# Patient Record
Sex: FEMALE | Race: OTHER
[De-identification: names, ages, dates, MRNs, and addresses within clinical notes are randomized per-mention and may not be internally consistent; named-entity substitution may affect disease eponyms.]

---

## 2021-11-10 ENCOUNTER — TRANSCRIPTION ENCOUNTER (OUTPATIENT)
Age: 71
End: 2021-11-10

## 2022-03-01 VITALS
WEIGHT: 114.64 LBS | HEIGHT: 59 IN | SYSTOLIC BLOOD PRESSURE: 120 MMHG | BODY MASS INDEX: 23.11 KG/M2 | DIASTOLIC BLOOD PRESSURE: 70 MMHG | TEMPERATURE: 97.1 F

## 2022-06-12 ENCOUNTER — NON-APPOINTMENT (OUTPATIENT)
Age: 72
End: 2022-06-12

## 2022-08-23 PROBLEM — Z00.00 ENCOUNTER FOR PREVENTIVE HEALTH EXAMINATION: Status: ACTIVE | Noted: 2022-08-23

## 2022-08-25 ENCOUNTER — NON-APPOINTMENT (OUTPATIENT)
Age: 72
End: 2022-08-25

## 2022-08-30 ENCOUNTER — APPOINTMENT (OUTPATIENT)
Dept: CARDIOLOGY | Facility: CLINIC | Age: 72
End: 2022-08-30

## 2022-10-31 ENCOUNTER — EMERGENCY (EMERGENCY)
Facility: HOSPITAL | Age: 72
LOS: 0 days | Discharge: HOME | End: 2022-10-31
Attending: EMERGENCY MEDICINE | Admitting: EMERGENCY MEDICINE

## 2022-10-31 VITALS
OXYGEN SATURATION: 98 % | HEART RATE: 86 BPM | DIASTOLIC BLOOD PRESSURE: 57 MMHG | WEIGHT: 130.07 LBS | SYSTOLIC BLOOD PRESSURE: 116 MMHG | RESPIRATION RATE: 18 BRPM

## 2022-10-31 DIAGNOSIS — E03.9 HYPOTHYROIDISM, UNSPECIFIED: ICD-10-CM

## 2022-10-31 DIAGNOSIS — R06.02 SHORTNESS OF BREATH: ICD-10-CM

## 2022-10-31 DIAGNOSIS — I65.23 OCCLUSION AND STENOSIS OF BILATERAL CAROTID ARTERIES: ICD-10-CM

## 2022-10-31 DIAGNOSIS — F32.A DEPRESSION, UNSPECIFIED: ICD-10-CM

## 2022-10-31 DIAGNOSIS — Z20.822 CONTACT WITH AND (SUSPECTED) EXPOSURE TO COVID-19: ICD-10-CM

## 2022-10-31 DIAGNOSIS — E78.5 HYPERLIPIDEMIA, UNSPECIFIED: ICD-10-CM

## 2022-10-31 DIAGNOSIS — F33.1 MAJOR DEPRESSIVE DISORDER, RECURRENT, MODERATE: ICD-10-CM

## 2022-10-31 DIAGNOSIS — E78.2 MIXED HYPERLIPIDEMIA: ICD-10-CM

## 2022-10-31 DIAGNOSIS — R94.31 ABNORMAL ELECTROCARDIOGRAM [ECG] [EKG]: ICD-10-CM

## 2022-10-31 LAB
ANION GAP SERPL CALC-SCNC: 11 MMOL/L — SIGNIFICANT CHANGE UP (ref 7–14)
APAP SERPL-MCNC: <5 UG/ML — LOW (ref 10–30)
BASOPHILS # BLD AUTO: 0.08 K/UL — SIGNIFICANT CHANGE UP (ref 0–0.2)
BASOPHILS NFR BLD AUTO: 1 % — SIGNIFICANT CHANGE UP (ref 0–1)
BUN SERPL-MCNC: 11 MG/DL — SIGNIFICANT CHANGE UP (ref 10–20)
CALCIUM SERPL-MCNC: 9.4 MG/DL — SIGNIFICANT CHANGE UP (ref 8.4–10.5)
CHLORIDE SERPL-SCNC: 98 MMOL/L — SIGNIFICANT CHANGE UP (ref 98–110)
CO2 SERPL-SCNC: 26 MMOL/L — SIGNIFICANT CHANGE UP (ref 17–32)
CREAT SERPL-MCNC: 0.6 MG/DL — LOW (ref 0.7–1.5)
EGFR: 95 ML/MIN/1.73M2 — SIGNIFICANT CHANGE UP
EOSINOPHIL # BLD AUTO: 0.17 K/UL — SIGNIFICANT CHANGE UP (ref 0–0.7)
EOSINOPHIL NFR BLD AUTO: 2.1 % — SIGNIFICANT CHANGE UP (ref 0–8)
ETHANOL SERPL-MCNC: <10 MG/DL — SIGNIFICANT CHANGE UP
GLUCOSE SERPL-MCNC: 104 MG/DL — HIGH (ref 70–99)
HCT VFR BLD CALC: 36.4 % — LOW (ref 37–47)
HGB BLD-MCNC: 12.4 G/DL — SIGNIFICANT CHANGE UP (ref 12–16)
IMM GRANULOCYTES NFR BLD AUTO: 0.2 % — SIGNIFICANT CHANGE UP (ref 0.1–0.3)
LYMPHOCYTES # BLD AUTO: 2.69 K/UL — SIGNIFICANT CHANGE UP (ref 1.2–3.4)
LYMPHOCYTES # BLD AUTO: 32.6 % — SIGNIFICANT CHANGE UP (ref 20.5–51.1)
MCHC RBC-ENTMCNC: 28.7 PG — SIGNIFICANT CHANGE UP (ref 27–31)
MCHC RBC-ENTMCNC: 34.1 G/DL — SIGNIFICANT CHANGE UP (ref 32–37)
MCV RBC AUTO: 84.3 FL — SIGNIFICANT CHANGE UP (ref 81–99)
MONOCYTES # BLD AUTO: 0.87 K/UL — HIGH (ref 0.1–0.6)
MONOCYTES NFR BLD AUTO: 10.5 % — HIGH (ref 1.7–9.3)
NEUTROPHILS # BLD AUTO: 4.43 K/UL — SIGNIFICANT CHANGE UP (ref 1.4–6.5)
NEUTROPHILS NFR BLD AUTO: 53.6 % — SIGNIFICANT CHANGE UP (ref 42.2–75.2)
NRBC # BLD: 0 /100 WBCS — SIGNIFICANT CHANGE UP (ref 0–0)
PLATELET # BLD AUTO: 251 K/UL — SIGNIFICANT CHANGE UP (ref 130–400)
POTASSIUM SERPL-MCNC: 4.3 MMOL/L — SIGNIFICANT CHANGE UP (ref 3.5–5)
POTASSIUM SERPL-SCNC: 4.3 MMOL/L — SIGNIFICANT CHANGE UP (ref 3.5–5)
RBC # BLD: 4.32 M/UL — SIGNIFICANT CHANGE UP (ref 4.2–5.4)
RBC # FLD: 12.4 % — SIGNIFICANT CHANGE UP (ref 11.5–14.5)
SALICYLATES SERPL-MCNC: <0.3 MG/DL — LOW (ref 4–30)
SARS-COV-2 RNA SPEC QL NAA+PROBE: SIGNIFICANT CHANGE UP
SODIUM SERPL-SCNC: 135 MMOL/L — SIGNIFICANT CHANGE UP (ref 135–146)
WBC # BLD: 8.26 K/UL — SIGNIFICANT CHANGE UP (ref 4.8–10.8)
WBC # FLD AUTO: 8.26 K/UL — SIGNIFICANT CHANGE UP (ref 4.8–10.8)

## 2022-10-31 PROCEDURE — 99285 EMERGENCY DEPT VISIT HI MDM: CPT

## 2022-10-31 PROCEDURE — 93010 ELECTROCARDIOGRAM REPORT: CPT

## 2022-10-31 PROCEDURE — 90792 PSYCH DIAG EVAL W/MED SRVCS: CPT | Mod: 95

## 2022-10-31 RX ORDER — SODIUM CHLORIDE 9 MG/ML
1000 INJECTION, SOLUTION INTRAVENOUS ONCE
Refills: 0 | Status: COMPLETED | OUTPATIENT
Start: 2022-10-31 | End: 2022-10-31

## 2022-10-31 RX ADMIN — SODIUM CHLORIDE 1000 MILLILITER(S): 9 INJECTION, SOLUTION INTRAVENOUS at 19:06

## 2022-10-31 RX ADMIN — SODIUM CHLORIDE 1000 MILLILITER(S): 9 INJECTION, SOLUTION INTRAVENOUS at 20:24

## 2022-10-31 NOTE — ED BEHAVIORAL HEALTH ASSESSMENT NOTE - OTHER PAST PSYCHIATRIC HISTORY (INCLUDE DETAILS REGARDING ONSET, COURSE OF ILLNESS, INPATIENT/OUTPATIENT TREATMENT)
reports remote treatment, no hospitalizations  recently prescribed Paxil by psychiatrist in [place] but has not been fully compliant

## 2022-10-31 NOTE — ED PROVIDER NOTE - NSFOLLOWUPCLINICS_GEN_ALL_ED_FT
[facility]  OP Mental Health  [address] [place] [id number]  Phone: [phone]  Fax:

## 2022-10-31 NOTE — ED BEHAVIORAL HEALTH ASSESSMENT NOTE - RISK ASSESSMENT
Low Acute Suicide Risk Risk factors include prior hx of depression, current depressive sx, old age.  Protective: supportive family, engagement in treatment, no prior self-harm

## 2022-10-31 NOTE — ED PROVIDER NOTE - CLINICAL SUMMARY MEDICAL DECISION MAKING FREE TEXT BOX
72yF presents with depression  triggered by sick family member no ANTOINE  pt medically cleared -  evaluated by telepsych - cleared for dc and outpt  mental health follow up

## 2022-10-31 NOTE — ED BEHAVIORAL HEALTH ASSESSMENT NOTE - HPI (INCLUDE ILLNESS QUALITY, SEVERITY, DURATION, TIMING, CONTEXT, MODIFYING FACTORS, ASSOCIATED SIGNS AND SYMPTOMS)
The patient is a 71 yo woman, domiciled with her /son/daughter-in-law, with PMH hypothyroidism and HLD, PPH long hx of MDD, but no prior hospitalizations, no SA, recently prescribed antidepressant while in [place] (Paxil 12.5mg), who is brought in by daughter-in-law due to concern for depression since returning from recent trip to [place].    On interview, the patient shows somewhat flat affect, congruent with her history. She is a detailed, reliable historian. She reports that in the past two weeks, she has been feeling low mood which would last several days and then improve spontaneously, along with anhedonia and lack of motivation, increased sleep, low energy, decreased interest in eating due to sleeping during the day. She reports self-discontinuing her Paxil (initially prescribed June/July) recently due to preference for natural remedies, and since then experiencing return of depressive sx. Patient expresses sometimes feeling like a burden and has thoughts of feeling "useless" but no suicidal thoughts beyond this. She reports some exhaustion form making "sacrifices" for her family. She continues to be motivated for outpatient treatment and is very open to receiving referrals.    See SW note for collateral from daughter-in-law. In short, she corroborates the history above from patient, and denies any safety concerns and has no concerns about taking patient home at this time. She also would prefer that patient not be hospitalized if possible.

## 2022-10-31 NOTE — ED BEHAVIORAL HEALTH ASSESSMENT NOTE - NSSUICPROTFACT_PSY_ALL_CORE
Responsibility to children, family, or others/Supportive social network of family or friends/Cultural, spiritual and/or moral attitudes against suicide/Frustration tolerance

## 2022-10-31 NOTE — ED PROVIDER NOTE - PHYSICAL EXAMINATION
Physical Exam    Vital Signs: I have reviewed the initial vital signs.  Constitutional: appears stated age, no acute distress  Eyes: Conjunctiva pink, Sclera clear  Cardiovascular: S1 and S2, regular rate, regular rhythm, well-perfused extremities, radial pulses equal and 2+, pedal pulses 2+ and equal  Respiratory: unlabored respiratory effort, clear to auscultation bilaterally no wheezing, rales and rhonchi  Gastrointestinal: soft, non-tender abdomen, no pulsatile mass, normal bowl sounds  Musculoskeletal: supple neck, no lower extremity edema, no midline tenderness  Integumentary: warm, dry, no rash  Neurologic: awake, alert, nvi  Psychiatric: appropriate mood, appropriate affect

## 2022-10-31 NOTE — ED ADULT NURSE NOTE - OBJECTIVE STATEMENT
As per pt family, patient has not been eating or drinking, and does not have energy to perform ADLs, she is depressed.

## 2022-10-31 NOTE — ED PROVIDER NOTE - NS ED ATTENDING STATEMENT MOD
This was a shared visit with the JAK. I reviewed and verified the documentation and independently performed the documented:

## 2022-10-31 NOTE — ED ADULT TRIAGE NOTE - CHIEF COMPLAINT QUOTE
As per patient's daughter in law, patient has been depressed lately. Not sleeping properly, not eating and lack of motivation

## 2022-10-31 NOTE — ED PROVIDER NOTE - PATIENT PORTAL LINK FT
You can access the FollowMyHealth Patient Portal offered by [facility] by registering at the following website: [email or web address]. By joining [employer]’s FollowMyHealth portal, you will also be able to view your health information using other applications (apps) compatible with our system.

## 2022-10-31 NOTE — ED BEHAVIORAL HEALTH NOTE - BEHAVIORAL HEALTH NOTE
================  FOR EACH COLLATERAL   ================  NAME: Regina Crowley     NUMBER: [phone]  RELATIONSHIP: Adult daughter-in-law  RELIABILITY: High    Patient gives permission to obtain collateral from daughter-in-law:  ( x ) Yes  (  )  No  Rationale for overriding objection            (  ) Lack of capacity. Details: ________            (  ) Assessing risk of danger to self/others. Details: ________  Rationale for selecting specific collateral source            (  ) Potential to impact risk of danger to self/others and no alternative equivalent. Details:  Collateral has requested that the information provided remain confidential: Yes [  ] No [ x ]  Collateral has provided information that patient is/may be unaware of: Yes [  ] No [ x ]    Patient is a 72F domiciled with adult son, daughter-in-law, and 15yo grandson, with 2 adult children, PPHx/o depression with hx/o outpatient psychiatric treatment, no IPP admissions, currently Rx = Paxil 12.5MG, Synthroid 50MG QD, and Rovastatin 10MG QD prescribed by pt's PCP, no legal hx, no access to firearms.      Regina stated that she brought the pt to the ER for concern of worsening depressive sx that have persisted in the last 12 days. Regina stated that the patient returned from a trip from [place] around the time that the patient started exhibiting sx/o worsening depression. In addition, Regina has observed increased sleep, getting out of bed, and poor appetite. Regina states patient has had extensive hx/o depression managed on an outpatient level in the past, however now currently is prescribed her psych meds from her PCP. Regina denies patient has expressed SI/HI/AVH, denies past SA/self-injury, denies hx/o aggression/violence. Regina denies safety concerns of patient returning home in the event of psychiatric clearance.

## 2022-10-31 NOTE — ED BEHAVIORAL HEALTH ASSESSMENT NOTE - NSBHATTESTTYPEVISIT_PSY_A_CORE
[Nausea] : nausea [Abdominal Pain] : abdominal pain [Hematochezia] : hematochezia [Negative] : Allergic/Immunologic [Emesis] : no emesis [Hematemesis] : no hematemesis [Constipation] : no constipation [Diarrhea] : no diarrhea Attending Only

## 2022-10-31 NOTE — ED BEHAVIORAL HEALTH ASSESSMENT NOTE - SUMMARY
This is a 73 yo woman with hx of MDD with recent reemergence of depressive sx, predominantly vegetative sx, in the past two weeks in setting of recent medication discontinuation. Patient currently meets criteria for a major depressive episode but maintains reasonable functioning as per mental status exam today and collateral information, and is not gravely disabled as to require involuntary psychiatric admission. She does not have suicidal ideation. Family's preference is also for her to be treated in the outpatient setting. Patient is open to outpatient referral, and this avenue including further med titration can be tried first before considering higher level of care if pt does not improve sufficiently in the near future.    Plan:  - psychiatrically cleared for discharge  - referral placed for [facility] outpatient MH clinic. They will reach out to pt to schedule appointment  - in the mean time, continue home Paxil ER 12.5mg

## 2022-10-31 NOTE — ED PROVIDER NOTE - ATTENDING APP SHARED VISIT CONTRIBUTION OF CARE
intepreter # [id number]    72-year-old female with a past medical history significant for depression, hypothyroidism, hyperlipidemia who presents with depression.  Patient states that since she returned from [place] and learning that a family member was sick she has been having depression.  Patient denies any SI HI.  Patient also denies any medical complaints.  Patient's daughter is at bedside who states that patient is not more confused or acting abnormally.  However patient is refusing to eat.     VITAL SIGNS: I have reviewed nursing notes and confirm.  CONSTITUTIONAL: non-toxic, well appearing  SKIN: no rash, no petechiae.  EYES: EOMI, pink conjunctiva, anicteric  ENT: tongue midline, no exudates, MMM  NECK: Supple; no meningismus, no JVD  CARD: RRR, no murmurs, equal radial pulses bilaterally 2+  RESP: CTAB, no respiratory distress  ABD: Soft, non-tender, non-distended, no peritoneal signs, no HSM, no CVA tenderness  EXT: Normal ROM x4. No edema. No calves tenderness  NEURO: Alert, oriented x3. CN2-12 intact, equal strength bilaterally, nl gait.  PSYCH: Cooperative, appropriate.    a/p  72 yr old f that presents with depression   -labs  -psych eval  -reassess  -dispo pending

## 2022-10-31 NOTE — ED PROVIDER NOTE - NS ED ROS FT
Constitutional: (-) fever, (-) chills  Eyes: (-) visual changes  ENT: (-) nasal congestions  Cardiovascular: (-) chest pain, (-) syncope  Respiratory: (-) cough, (-) shortness of breath, (-) dyspnea,   Gastrointestinal: (-) vomiting, (-) diarrhea, (-)nausea,  Musculoskeletal: (-) neck pain, (-) back pain, (-) joint pain,  Integumentary: (-) rash, (-) edema, (-) bruises  Neurological: (-) headache, (-) loc, (-) dizziness, (-) tingling, (-)numbness,  Psychiatric: (-) hallucinations, (-)nervousness, (+)depression, (-)SI/HI  Peripheral Vascular: (-) leg swelling  :  (-)dysuria,  (-) hematuria  Allergic/Immunologic: (-) pruritus

## 2022-10-31 NOTE — ED BEHAVIORAL HEALTH ASSESSMENT NOTE - NSBHADMITCOORDWITH_PSY_A_CORE
I spent a total of 50 minutes reviewing past medical records, evaluating the patient, discussing treatment options with the patient, communicating with other healthcare professionals, coordinating care, and documenting in the EMR./medical staff/family/Caregiver

## 2022-10-31 NOTE — ED BEHAVIORAL HEALTH NOTE - BEHAVIORAL HEALTH NOTE
==================       PRE-HOSPITAL COURSE       ===================      SOURCE:  Secondhand EMR documentation.       DETAILS:  Patient presents by daughter in law to ED; chief complaint of depression.     ==============   ED COURSE:       ===========       SOURCE:  RN and secondhand EMR documentation.        ARRIVAL:  Patient noted to be cooperative with hospital protocol. Patient presents with good hygiene/grooming.         BELONGINGS:  None notable.       BEHAVIOR: Blood provided for routine labs without noted incident. NO SI/HI/AH/VH elicited per RN. Patient is alert, oriented, and makes eye contact; speech of normal volume and rate accompanied by logical thought process. Patient has remained calm and cooperating with ED staff, sitting in chair in front of nurses station.   TREATMENT: Patient had not required medication intervention while in ED; remains in behavioral control.   Visitors: Patient presently unaccompanied by social supports while in ED; daughter in law was reportedly in ED earlier.

## 2022-10-31 NOTE — ED PROVIDER NOTE - NSFOLLOWUPINSTRUCTIONS_ED_ALL_ED_FT
Follow up with outpatient mental health    RETURN TO ED  FOR ANY NEW WORSENING OR CONCERNING SYMPTOMS TO YOU, ALSO AS WE DISCUSSED. WE ARE HERE AND HAPPY TO TAKE CARE OF YOU        DEPRESSION - General Information     Depression    WHAT YOU NEED TO KNOW:    What is depression? Depression is a medical condition that causes feelings of sadness or hopelessness that do not go away. Depression may cause you to lose interest in things you used to enjoy. These feelings may interfere with your daily life.    What causes or increases my risk for depression? Depression may be caused by changes in brain chemicals that affect your mood. Your risk for depression may be higher if you have any of the following:    Stressful events such as the death of a loved one, unemployment, or divorce      A family history of depression      A chronic medical condition, such as diabetes, heart disease, or cancer      Drug or alcohol abuse    What are the signs and symptoms of depression?     Appetite changes, or weight gain or loss      Trouble going to sleep or staying asleep, or sleeping too much      Fatigue or lack of energy      Feeling restless, irritable, or withdrawn      Feeling worthless, hopeless, discouraged, or guilty      Trouble concentrating, remembering things, doing daily tasks, or making decisions      Thoughts about hurting or killing yourself    How is depression diagnosed? Your healthcare provider will ask about your symptoms and how long you have had them. He or she will ask if you have any family members with depression. Tell your healthcare provider about any stressful events in your life. He or she may ask about any other health conditions or medicines you take.     How is depression treated?     Therapy is often used together with medicine. Therapy is a way for you to talk about your feelings and anything that may be causing depression. Therapy can be done alone or in a group. It may also be done with family members or a significant other.      Antidepressant medicine may be given to relieve depression. You may need to take this medicine for several weeks before you begin to feel better. Tell your healthcare provider about any side effects or problems you have with your medicine. The type or amount of medicine may need to be changed.    How can I manage depression?     Get regular physical activity. Try to be active for 30 minutes, 3 to 5 days a week. Physical activity can help relieve depression. Work with your healthcare provider to develop a plan that you enjoy. It may help to ask someone to be active with you.      Create a regular sleep schedule. A routine can help you relax before bed. Listen to music, read, or do yoga. Try to go to bed and wake up at the same time every day. Sleep is important for emotional health.      Eat a variety of healthy foods. Healthy foods include fruits, vegetables, whole-grain breads, low-fat dairy products, lean meats, fish, and cooked beans. A healthy meal plan is low in fat, salt, and added sugar.      Do not drink alcohol or use drugs. Alcohol and drugs can make depression worse. Talk to your therapist or doctor if you need help quitting.    The following resources are available at any time to help you, if needed:     National Suicide Prevention Lifeline: 1-381.328.7534 (8-366-162-TALK)      Suicide Hotline: 1-935.607.2234 (5-154-DPJBCJT)      For a list of international numbers: https://save.org/find-help/international-resources/    Call your local emergency number (911 in the [place]) if:     You think about harming yourself or someone else.      You have done something on purpose to hurt yourself.    When should I call my therapist or doctor?     Your symptoms do not improve.      You cannot make it to your next appointment.       You have new symptoms.    You have questions or concerns about your condition or care    CARE AGREEMENT:  You have the right to help plan your care. Learn about your health condition and how it may be treated. Discuss treatment options with your healthcare providers to decide what care you want to receive. You always have the right to refuse treatmen

## 2022-11-01 DIAGNOSIS — R06.02 SHORTNESS OF BREATH: ICD-10-CM

## 2022-11-01 DIAGNOSIS — E03.9 HYPOTHYROIDISM, UNSPECIFIED: ICD-10-CM

## 2022-11-01 DIAGNOSIS — E78.2 MIXED HYPERLIPIDEMIA: ICD-10-CM

## 2022-11-01 DIAGNOSIS — I10 ESSENTIAL (PRIMARY) HYPERTENSION: ICD-10-CM

## 2022-11-01 DIAGNOSIS — I65.23 OCCLUSION AND STENOSIS OF BILATERAL CAROTID ARTERIES: ICD-10-CM

## 2022-11-09 ENCOUNTER — APPOINTMENT (OUTPATIENT)
Dept: CARDIOLOGY | Facility: CLINIC | Age: 72
End: 2022-11-09

## 2022-11-21 ENCOUNTER — NON-APPOINTMENT (OUTPATIENT)
Age: 72
End: 2022-11-21

## 2022-11-21 DIAGNOSIS — R94.31 ABNORMAL ELECTROCARDIOGRAM [ECG] [EKG]: ICD-10-CM

## 2022-11-21 RX ORDER — LEVOTHYROXINE SODIUM 50 UG/1
50 TABLET ORAL DAILY
Refills: 0 | Status: ACTIVE | COMMUNITY

## 2022-11-21 RX ORDER — BUPROPION HYDROCHLORIDE 150 MG/1
150 TABLET, FILM COATED, EXTENDED RELEASE ORAL
Refills: 0 | Status: ACTIVE | COMMUNITY

## 2022-11-21 RX ORDER — ROSUVASTATIN CALCIUM 10 MG/1
10 TABLET, FILM COATED ORAL DAILY
Refills: 0 | Status: ACTIVE | COMMUNITY

## 2022-12-06 ENCOUNTER — APPOINTMENT (OUTPATIENT)
Dept: CARDIOLOGY | Facility: CLINIC | Age: 72
End: 2022-12-06

## 2023-06-24 NOTE — ED PROVIDER NOTE - OBJECTIVE STATEMENT
71 yo female, no pmh, presents to ed with daughter for depression, just got back from [place], pt feels sad since brother is ill, dec po intake, no pain or radiation. Denies fever, chills, cp, sob, neck pain, visual changes, nvd, dizziness, numbness, tingling, si/hi.
Statement Selected

## 2023-07-27 ENCOUNTER — APPOINTMENT (OUTPATIENT)
Dept: SPEECH THERAPY | Facility: CLINIC | Age: 73
End: 2023-07-27

## 2023-11-20 ENCOUNTER — EMERGENCY (EMERGENCY)
Facility: HOSPITAL | Age: 73
LOS: 0 days | Discharge: HOME | End: 2023-11-20
Attending: EMERGENCY MEDICINE
Payer: MEDICARE

## 2023-11-20 VITALS
RESPIRATION RATE: 18 BRPM | WEIGHT: 134.92 LBS | SYSTOLIC BLOOD PRESSURE: 140 MMHG | HEART RATE: 73 BPM | DIASTOLIC BLOOD PRESSURE: 69 MMHG | OXYGEN SATURATION: 98 % | TEMPERATURE: 98 F | HEIGHT: 62 IN

## 2023-11-20 DIAGNOSIS — E03.9 HYPOTHYROIDISM, UNSPECIFIED: ICD-10-CM

## 2023-11-20 DIAGNOSIS — E78.5 HYPERLIPIDEMIA, UNSPECIFIED: ICD-10-CM

## 2023-11-20 DIAGNOSIS — R13.10 DYSPHAGIA, UNSPECIFIED: ICD-10-CM

## 2023-11-20 DIAGNOSIS — F32.A DEPRESSION, UNSPECIFIED: ICD-10-CM

## 2023-11-20 PROCEDURE — 99282 EMERGENCY DEPT VISIT SF MDM: CPT

## 2023-11-20 PROCEDURE — 99283 EMERGENCY DEPT VISIT LOW MDM: CPT

## 2023-11-20 NOTE — ED PROVIDER NOTE - CARE PROVIDER_API CALL
Kareem Gannon  Otolaryngology  [address] [place] [address], [address]  [place] [address]  Phone: [phone]  Fax: [phone]  Follow Up Time:

## 2023-11-20 NOTE — ED PROVIDER NOTE - NSFOLLOWUPINSTRUCTIONS_ED_ALL_ED_FT
There are many different causes for your symptoms.  We do not believe it is anything serious.  I recommend that you stop taking the benzoate tablets and take an over-the-counter antacid such as Pepcid.  We will refer you to our ear nose throat specialist.  Return to emergency room if you develop a fever, you are unable to swallow, you have trouble breathing, or have any new concerns.

## 2023-11-20 NOTE — ED PROVIDER NOTE - PROGRESS NOTE DETAILS
Patient given water and swallowed in ED without difficulty, do not suspect foreign body or any acute issue, recommend the patient's stop using Tessalon Perles, take an antacid, and will arrange for expedited ENT follow-up

## 2023-11-20 NOTE — ED PROVIDER NOTE - OBJECTIVE STATEMENT
73-year-old female past medical history of hypothyroidism, dyslipidemia, and depression brought in by son for 1 to 2 weeks of difficulty swallowing described as a feeling of mucus in her throat, worse at night when she reclines, has had similar symptoms in the past and has been on montelukast and Tessalon Perles for the last 6 months, no chest pain or trouble breathing, no weight loss

## 2023-11-20 NOTE — ED PROVIDER NOTE - PATIENT PORTAL LINK FT
You can access the FollowMyHealth Patient Portal offered by [facility] by registering at the following website: [email or web address]. By joining [employer] [employer]’s FollowMyHealth portal, you will also be able to view your health information using other applications (apps) compatible with our system.

## 2023-11-20 NOTE — ED PROVIDER NOTE - NSPTACCESSSVCSAPPTDETAILS_ED_ALL_ED_FT
patient with feeling something stuck in throat x 1-2 weeks, has been on benzoate for the last 6 months, for followup in the next 1 week

## 2023-11-20 NOTE — ED PROVIDER NOTE - PHYSICAL EXAMINATION
Vital Signs: I have reviewed the initial vital signs.  Constitutional: well-nourished, appears stated age, no acute distress  HEENT: NC/AT, PERRLA, EOMI, conjunctivae pink, sclerae anicteric, mmm, oropharynx clear  Neck: supple  Cardiovascular: RRR no m/r/g  Respiratory: CTA no w/r/r  Gastrointestinal: +bs, snt/nd  Musculoskeletal: FROM x 4 no c/c/e, pulses equal calves nontender  Integumentary: warm, dry, no rash  Neurologic: awake, alert, cranial nerves II-XII grossly intact, extremities’ motor and sensory functions grossly intact  Psychiatric: appropriate mood, appropriate affect

## 2023-11-20 NOTE — ED ADULT NURSE NOTE - NSFALLRISKFACTORS_ED_ALL_ED
What Type Of Note Output Would You Prefer (Optional)?: Standard Output How Severe Is Your Skin Lesion?: moderate Has Your Skin Lesion Been Treated?: been treated Is This A New Presentation, Or A Follow-Up?: Skin Lesions No indicators present

## 2023-11-20 NOTE — ED ADULT NURSE NOTE - NSFALLUNIVINTERV_ED_ALL_ED
Bed/Stretcher in lowest position, wheels locked, appropriate side rails in place/Call bell, personal items and telephone in reach/Instruct patient to call for assistance before getting out of bed/chair/stretcher/Non-slip footwear applied when patient is off stretcher/[place] to call system/Physically safe environment - no spills, clutter or unnecessary equipment/Purposeful proactive rounding/Room/bathroom lighting operational, light cord in reach

## 2023-11-20 NOTE — ED PROVIDER NOTE - CLINICAL SUMMARY MEDICAL DECISION MAKING FREE TEXT BOX
Patient with sensation of mucus in throat upon swallowing, exam as above, advised to take OTC antacid and start Tessalon Perles and follow-up with ENT.  Patient and her son counseled regarding conditions which should prompt return.

## 2025-03-08 ENCOUNTER — APPOINTMENT (OUTPATIENT)
Dept: CARE COORDINATION | Facility: HOME HEALTH | Age: 75
End: 2025-03-08

## 2025-04-26 ENCOUNTER — APPOINTMENT (OUTPATIENT)
Dept: CARE COORDINATION | Facility: HOME HEALTH | Age: 75
End: 2025-04-26

## 2025-04-26 DIAGNOSIS — F33.42 MAJOR DEPRESSIVE DISORDER, RECURRENT, IN FULL REMISSION: ICD-10-CM

## 2025-04-26 DIAGNOSIS — F32.A DEPRESSION, UNSPECIFIED: ICD-10-CM

## 2025-04-26 PROCEDURE — G0444 DEPRESSION SCREEN ANNUAL: CPT | Mod: 59,2W

## 2025-04-26 PROCEDURE — 99348 HOME/RES VST EST LOW MDM 30: CPT | Mod: 93,25

## 2025-04-29 PROBLEM — F33.42 RECURRENT MAJOR DEPRESSIVE DISORDER, IN FULL REMISSION: Status: ACTIVE | Noted: 2025-04-29

## 2025-04-29 PROBLEM — F32.A DEPRESSION: Status: ACTIVE | Noted: 2025-04-29

## 2025-04-29 RX ORDER — QUETIAPINE FUMARATE 25 MG/1
25 TABLET ORAL
Qty: 1 | Refills: 0 | Status: ACTIVE | COMMUNITY
Start: 2025-04-26